# Patient Record
(demographics unavailable — no encounter records)

---

## 2025-07-30 NOTE — PHYSICAL EXAM
[FreeTextEntry1] : ============================= NEUROLOGICAL EXAMINATION ================================ [ ] GENERAL: alert, appears well, non-toxic [ ] BEHAVIOR: restricted affect, pleasant and cooperative. No disinhibition, no agitation, no apathy. Insight is present. Unclear if any anosognosia due to lack of collateral historian.  [ ] COGNITION: alert, with good basic attention (digit forward 5 ). Sustained attention good for MOCA "A" vigilance task (1/1). Working memory good (4/5, 5/5, 5/5, MOCA registration). Complex attention with mild difficulty for digit reverse (3) and WORLD reverse (5/5). Calculation for serial 7's excellent, 5/5 ([id number], 65). Language fluent, with normal prosody and phrase length. Phonemic fluency (F) was 11. Category fluency (animals) was 17. Comprehension intact to grammatically complex questions, a three-step command (3/3), and to a command crossing midline. Repetition intact (2/2). Reading intact (with only +1 surface dyslexia, 5/6 PBAC+). Writing intact (1/1). Naming intact (3/3 MOCA, 2/2 MMSE, 6/6 NIHSS, 6/6 PBAC). Oriented 5/5 to time and 5/5 to place. Short term recall for an unstructured word list was 3/5 (+1 MCQ. No benefit for cuing for RED. Isolated perseveration on WORLD). Abstraction good (2/2 MOCA). Visual-exec good for clock drawing (3/3) but error for MOCA visual trails (0/1, connected letters and numbers in 2 independent pathways, albeit correct). MOCA 27/30 [ ] SPEECH: guttural dysarthria, dysphonia (states he has dysphagia)* [ ] CRANIAL NERVES: PERRL, VFF (binocularly to finger count), EOMI, facial sensation intact bilaterally to PP, face symmetric (L ptosis, baseline x decades), facial movements full, tongue protrudes slightly to L but movements full. Uvula midline. [ ] MOTOR: Power 5/5 grossly all limbs without PND. No tremor, no bradykinesia. Some mildly increased tone bilat UE. [ ] CO-ORDINATION: FNF intact bilaterally [ ] GAIT: can rise from seated position without using arms. stooped posture, leaning to R (he says since childhood), mild reduced arm swing. narrow based and not shuffling or magnetic.

## 2025-07-30 NOTE — END OF VISIT
[Time Spent: ___ minutes] : I have spent [unfilled] minutes of time on the encounter which excludes teaching and separately reported services. sharp/stabbing

## 2025-07-30 NOTE — ASSESSMENT
[FreeTextEntry1] : ===================================== ASSESSMENT =====================================  80M former  with GERD, BPH, hypothyroid, cataract s/p surgery, dysphagia (since 2019, unclear etiology, seen by neuro at [place], had EMG and follows with SLP here), L ptosis x decades, STM loss since age 75 (circa 2020), has been followed at [place] for ?MCI. MRI there a few years ago had revealed ?possible hippocampal atrophy, and pt had formal NP testing (was told abnormal for his age). His family history is notable for AD in his older brother, "pre AD" in his older sister, and Parkinson's in his younger brother. Bedside cognitive testing today notable for mild exec dysfunction (MOCA visual trails) and short term recall 3/5 (+1 MCQ retrieval. No benefit for cuing for RED. Isolated perseveration on WORLD). MOCA 27/30. Neuro exam notable for baseline L ptosis and dysphonia (vs guttural dysarthria). Gait shows stooped posture, leaning to R (he says since childhood), mild reduced arm swing. Exam otherwise normal, non focal, and without parkinsonism.   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 It is unclear if the dysphagia is related but there are no other signs of PD/PSP/CBS.  ===================================== PLAN =====================================  - he is already on Galantamine ER 16mg (I refilled) - refilled Trazodone 50mg QHS - he is aware that he must seek further refills from PMD - MRI brain - RTC 6 months with  Gene - [consider JINNY afterwards if appropriate based on further HPI]

## 2025-07-30 NOTE — DATA REVIEWED
[de-identified] : SLP Clair Here (Aug 2023) Dysphagia Therapy.  Speech/Audio Discussion  Patient seen today for 3rd dysphagia therapy visit.  No auth required for dysphagia therapy.  Patient was initially seen on 1/7/21 for initial swallow evaluation.  At that time, patient reported a 3-year history of dysphagia characterized by pharyngeal stasis with solids and pills with inconsistent sensation of stasis as well as nasal regurgitation and occasional coughing when drinking liquids "too fast."  During evaluation on 1/7/21, patient presented with mild flaccid dysarthria with associated mild lingual fasciculations.  Occasional wet gurgly vocal quality was also noted, attributed to reduced pharyngeal secretion management.  FEES showed a primarily pharyngeal dysphagia marked by reduced pharyngeal swallow efficiency with moderate diffuse post-deglutitive pharyngeal residue with puree and solid consistencies that required repeated liquid wash and dry swallows to clear.  There was also suspected aspiration during the swallow of initial thin liquid trial but with no evidence of penetration/aspiration for the remained of the study.  Patient recommended a moist solid diet with thin liquids and with aspiration precautions, a course of dysphagia therapy, and neurological evaluation.  Patient underwent neurological evaluation at [facility], which was reportedly unremarkable.  Patient was seen for 4 dysphagia therapy sessions from 1/21/21 to 3/11/21.  Repeat FEES on 4/8/21 showed pharyngeal swallow to be essentially unchanged from baseline exam on 1/7/21 in spite of performing dysphagia exercises daily for the past three (3) months. As such, dysphagia therapy was discontinued.  A modified barium swallow study was recommended to further assess oropharyngeal swallow.  MBSS performed on 6/3/21 showed pharyngeal dysphagia with reduced pharyngeal swallow efficiency resulting in silent aspiration of thin liquid during the swallow and diffuse pharyngeal residue across trials ( puree/solid > liquid).  Patient recommended moist regular diet with nectar-thick liquids and resumption of dysphagia therapy.  During that visit, patient endorsed deterioration of his swallow functions since stopping dysphagia therapy in April 2021.  Subsequently, patient was seen for 3 session of dysphagia therapy from 6/22 to 7/20/21.  He was then seen for repeat MBSS on 9/21/21 ( after 3 months of performing swallow exercises).  Exam again showed moderate pharyngeal dysphagia with reduced pharyngeal clearance and aspiration of residue during latter part of the study with inconsistent cough response.  Patient recommended moist solid diet with thin liquids with safe swallow strategies.  Given limited change in swallow functions with dysphagia therapy course, patient was discharged from skilled SLP services.  He was, however, recommended to continue with swallow exercises to maintain therapeutic gains.  Patient reported that he continued performing swallow exercises up until Nov 2021 then stopped as he was not seeing improvement.   Patient then experienced some decline in his swallowing functions in early 2022.  He noted some chest pain which he thought might be related to aspiration.  He then resumed safe swallow strategies and took meds with nectar-thick liquids and chest pain resolved.  He then pursued further swallow work-up.  He consulted with Dr. Phelps (GI) and underwent testing including manometry and pH probe.  Patient reported that tests showed he had high frequency of reflux events thought to be related to his hiatal hernia.  He was switched to Dexilant from omeprazole and was recommended consultation for possible Nissen funduplication.  Patient also seen at [facility] for repeat MBSS on 5/24/22.  Exam showed a mild to moderate dysphagia with trace (silent) aspiration of thin liquid via uncontrolled cup sip during and after the swallow and moderate pharyngeal residue across trials but with ability to clear with dry swallows.  He was recommended regular diet with thin liquids with aspiration precautions and resumption of dysphagia therapy.  Patient then seen on 6/10/22 for dysphagia therapy visit.  At that time, patient was instructed in performing Camille maneuver x 20 reps, CTAR using Shaker protocol, and EMST (training started at 1/4 turn above 90).  During our follow-up visit on 9/26/22, patient reported that he has been performing swallow exercises for 3+ months but that his swallow functions were generally unchanged.  Given this, we discussed discontinuing swallow exercises as they do not seem beneficial in improving his swallow functions.  He has discontinued performing swallow exercises in Nov 2022 and he has noted some deterioration of his swallowing functions in the past couple of months.  As such, he reconsulted with our service for swallowing re-evaluation.  Patient then underwent repeat MBSS on 7/26/23.  Exam showed persistent moderate to severe dysphagia with trace deep penetration of thin liquid during the swallow and significant pharyngeal residue on puree that did not clear with dry swallows.  Liquid wash resulted in silent aspiration during the swallow.  Patient recommended to continue with regular diet with thin liquids as tolerated, as well as continuation of dysphagia exercises.  Overall, pharyngeal swallow efficiency is reduced on current exam compared to swallow functions on MBSS in 2021/2022.   Today, pt received awake and alert, pleasant.  He reported that he resumed performing CTAR exercises and Camille maneuver ( 2 sets of 10 reps) 2x a day since his last MBSS and he feels that his overall swallow function is better.  He reported coughing on thin liquids on rare occasion and sensation of pharyngeal residue with solid foods that is cleared with liquid wash.  He is currently on a regular diet with thin liquids and using mildly-thick liquids when taking pills.  He eats 2-3 meals per day and takes about 1 hour to consume his main meal.  Patient also reported that he had cataract surgery a couple of weeks ago.  Results of his recent MBSS and previous MBSS were reviewed with patient using video clips from these exams.  Salient findings of increased pharyngeal residue with reduced ability to clear, as well as silent aspiration of thin liquid (when used as liquid wash) were emphasized.  Given his report of swallow deterioration without exercise and perceived improvement since he restarted swallow exercises, we discussed for him to continue performing CTAR exercises (3 repetitions of isometric exercise- holding chin tuck position for 1 minute and 3 sets of isokinetic exercise -10 reps of chin tuck holding position for 1 sec) and Camille maneuver x 20 reps.  Given his silent aspiration, we also discussed resuming EMST 3x a week once cleared by ophtalmologist given recent cataract surgery.  Patient will monitor his swallow functions and RTC for follow-up or re-evaluation prn.

## 2025-07-30 NOTE — ASSESSMENT
[FreeTextEntry1] : ===================================== ASSESSMENT =====================================  80M former  with GERD, BPH, hypothyroid, cataract s/p surgery, dysphagia (since 2019, unclear etiology, seen by neuro at [place], had EMG and follows with SLP here), L ptosis x decades, STM loss since age 75 (circa 2020), has been followed at [place] for ?MCI. MRI there a few years ago had revealed ?possible hippocampal atrophy, and pt had formal NP testing (was told abnormal for his age). His family history is notable for AD in his older brother, "pre AD" in his older sister, and Parkinson's in his younger brother. Bedside cognitive testing today notable for mild exec dysfunction (MOCA visual trails) and short term recall 3/5 (+1 MCQ retrieval. No benefit for cuing for RED. Isolated perseveration on WORLD). MOCA 27/30. Neuro exam notable for baseline L ptosis and dysphonia (vs guttural dysarthria). Gait shows stooped posture, leaning to R (he says since childhood), mild reduced arm swing. Exam otherwise normal, non focal, and without parkinsonism.   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 It is unclear if the dysphagia is related but there are no other signs of PD/PSP/CBS.  ===================================== PLAN =====================================  - he is already on Galantamine ER 16mg (I refilled) - refilled Trazodone 50mg QHS - he is aware that he must seek further refills from PMD - MRI brain - RTC 6 months with  Gene - [consider JINNY afterwards if appropriate based on further HPI]

## 2025-07-30 NOTE — DATA REVIEWED
[de-identified] : SLP Clair Here (Aug 2023) Dysphagia Therapy.  Speech/Audio Discussion  Patient seen today for 3rd dysphagia therapy visit.  No auth required for dysphagia therapy.  Patient was initially seen on 1/7/21 for initial swallow evaluation.  At that time, patient reported a 3-year history of dysphagia characterized by pharyngeal stasis with solids and pills with inconsistent sensation of stasis as well as nasal regurgitation and occasional coughing when drinking liquids "too fast."  During evaluation on 1/7/21, patient presented with mild flaccid dysarthria with associated mild lingual fasciculations.  Occasional wet gurgly vocal quality was also noted, attributed to reduced pharyngeal secretion management.  FEES showed a primarily pharyngeal dysphagia marked by reduced pharyngeal swallow efficiency with moderate diffuse post-deglutitive pharyngeal residue with puree and solid consistencies that required repeated liquid wash and dry swallows to clear.  There was also suspected aspiration during the swallow of initial thin liquid trial but with no evidence of penetration/aspiration for the remained of the study.  Patient recommended a moist solid diet with thin liquids and with aspiration precautions, a course of dysphagia therapy, and neurological evaluation.  Patient underwent neurological evaluation at [facility], which was reportedly unremarkable.  Patient was seen for 4 dysphagia therapy sessions from 1/21/21 to 3/11/21.  Repeat FEES on 4/8/21 showed pharyngeal swallow to be essentially unchanged from baseline exam on 1/7/21 in spite of performing dysphagia exercises daily for the past three (3) months. As such, dysphagia therapy was discontinued.  A modified barium swallow study was recommended to further assess oropharyngeal swallow.  MBSS performed on 6/3/21 showed pharyngeal dysphagia with reduced pharyngeal swallow efficiency resulting in silent aspiration of thin liquid during the swallow and diffuse pharyngeal residue across trials ( puree/solid > liquid).  Patient recommended moist regular diet with nectar-thick liquids and resumption of dysphagia therapy.  During that visit, patient endorsed deterioration of his swallow functions since stopping dysphagia therapy in April 2021.  Subsequently, patient was seen for 3 session of dysphagia therapy from 6/22 to 7/20/21.  He was then seen for repeat MBSS on 9/21/21 ( after 3 months of performing swallow exercises).  Exam again showed moderate pharyngeal dysphagia with reduced pharyngeal clearance and aspiration of residue during latter part of the study with inconsistent cough response.  Patient recommended moist solid diet with thin liquids with safe swallow strategies.  Given limited change in swallow functions with dysphagia therapy course, patient was discharged from skilled SLP services.  He was, however, recommended to continue with swallow exercises to maintain therapeutic gains.  Patient reported that he continued performing swallow exercises up until Nov 2021 then stopped as he was not seeing improvement.   Patient then experienced some decline in his swallowing functions in early 2022.  He noted some chest pain which he thought might be related to aspiration.  He then resumed safe swallow strategies and took meds with nectar-thick liquids and chest pain resolved.  He then pursued further swallow work-up.  He consulted with Dr. Phelps (GI) and underwent testing including manometry and pH probe.  Patient reported that tests showed he had high frequency of reflux events thought to be related to his hiatal hernia.  He was switched to Dexilant from omeprazole and was recommended consultation for possible Nissen funduplication.  Patient also seen at [facility] for repeat MBSS on 5/24/22.  Exam showed a mild to moderate dysphagia with trace (silent) aspiration of thin liquid via uncontrolled cup sip during and after the swallow and moderate pharyngeal residue across trials but with ability to clear with dry swallows.  He was recommended regular diet with thin liquids with aspiration precautions and resumption of dysphagia therapy.  Patient then seen on 6/10/22 for dysphagia therapy visit.  At that time, patient was instructed in performing Camille maneuver x 20 reps, CTAR using Shaker protocol, and EMST (training started at 1/4 turn above 90).  During our follow-up visit on 9/26/22, patient reported that he has been performing swallow exercises for 3+ months but that his swallow functions were generally unchanged.  Given this, we discussed discontinuing swallow exercises as they do not seem beneficial in improving his swallow functions.  He has discontinued performing swallow exercises in Nov 2022 and he has noted some deterioration of his swallowing functions in the past couple of months.  As such, he reconsulted with our service for swallowing re-evaluation.  Patient then underwent repeat MBSS on 7/26/23.  Exam showed persistent moderate to severe dysphagia with trace deep penetration of thin liquid during the swallow and significant pharyngeal residue on puree that did not clear with dry swallows.  Liquid wash resulted in silent aspiration during the swallow.  Patient recommended to continue with regular diet with thin liquids as tolerated, as well as continuation of dysphagia exercises.  Overall, pharyngeal swallow efficiency is reduced on current exam compared to swallow functions on MBSS in 2021/2022.   Today, pt received awake and alert, pleasant.  He reported that he resumed performing CTAR exercises and Camille maneuver ( 2 sets of 10 reps) 2x a day since his last MBSS and he feels that his overall swallow function is better.  He reported coughing on thin liquids on rare occasion and sensation of pharyngeal residue with solid foods that is cleared with liquid wash.  He is currently on a regular diet with thin liquids and using mildly-thick liquids when taking pills.  He eats 2-3 meals per day and takes about 1 hour to consume his main meal.  Patient also reported that he had cataract surgery a couple of weeks ago.  Results of his recent MBSS and previous MBSS were reviewed with patient using video clips from these exams.  Salient findings of increased pharyngeal residue with reduced ability to clear, as well as silent aspiration of thin liquid (when used as liquid wash) were emphasized.  Given his report of swallow deterioration without exercise and perceived improvement since he restarted swallow exercises, we discussed for him to continue performing CTAR exercises (3 repetitions of isometric exercise- holding chin tuck position for 1 minute and 3 sets of isokinetic exercise -10 reps of chin tuck holding position for 1 sec) and Camille maneuver x 20 reps.  Given his silent aspiration, we also discussed resuming EMST 3x a week once cleared by ophtalmologist given recent cataract surgery.  Patient will monitor his swallow functions and RTC for follow-up or re-evaluation prn.

## 2025-07-30 NOTE — HISTORY OF PRESENT ILLNESS
[FreeTextEntry1] : Chief Complaint: memory loss ============= Referring Provider: former [facility] patient; pt looking to switch to [facility] and [facility]  ============== =========================== History of Presenting Illness: =========================== 80M with GERD, BPH, hypothyroid, dysphagia (since 2019, unclear etiology, seen by neuro at [facility], had EMG and follows with SLP here), L ptosis x decades, STM loss since age 75, is here for transfer of care. He was being followed for his ?possible MCI by neuropsychiatry at [facility] (Dr Dary Samson-  of Neurology and Psychiatry, and the  for Behavioral Neurology Education). Pt was told the doctor was leaving the practice, and then pt wasn't able to get appointment at either [facility] or [facility] in a timely fashion. Switched to [facility] - got appointment here 2 months ago (although was ultimately given an appointment at [facility] but cancelled due to frustration with the process).  He also needs Trazodone refills, which had been prescribed by Dr Samson.  Patient is here today alone, in the absence of a collateral historian. The patient lives with his  (Johny Verduzco, age 65); they've been together for 30 years.   Patient saw [facility] neuropsych first circa , b/c he realized something was wrong: had difficulty doing simple mental arithmetic while visually spacing tiles during a DIY project (he would usually be good at this). At some point, he made some navigation errors in a rental car (has since decided not to drive anymore).   Pt states that he underwent cognitive testing and MRI brain (most recent ) - was told that "the memory centers were abnormally shrunken". He has been taking galantamine since . He doesn't know diagnosis but states he had significant cognitive impairment that was " abnormal for my age". He was not told anything about Alzheimer's pathology or dementia.   iADLS - Patient is a former  (code, published financial report). He retired age 73 (end of 2018). He was not having any issues on the job. Pt does the finances.  Johny does the cooking. Pt helps with some cleaning. He denies issues. As mentioned above, had problems navigating in his rental car and stopped driving. He navigated to his appointment here in  today.  Cognitive ROS: ---------------------- [] Mood: pretty good; denies sadness or depression [] Psychosis: no hallucinations or delusions [] Sleep: uses Trazodone b/c melatonin didn't work. No problems with sleep initiation but rather sleep maintenance. The Trazodone helped but may be causing vivid dreams and increased urination.  has never told pt that he acts out his dreams. No circadian rhythm reversal.  [] Appetite: steady, no weight changes.  [] Motor: ambulates independently without assist devices. Practices balance exercises b/c is a bit wobbly at times. No falls or almost-falls. No tremor or limb stiffness. No weakness of limbs, including of the fine movements, and no problems rising from a chair.  [] LOC: no [] UI: has increased urinary frequency with nocturia x3 but no UI [] Anosmia: no [] Dysphagia: yes, since 2019 (see SLP notes) - has been seen at [facility] and [facility] -- he doesn't remember name and cannot describe her.  [] Other: no diplopia   ADLs -------- [] Toileting: Self. No UI/FI. [] Bathing: Self. Does not need to be prompted. It is performed correctly. [] Eating: Self. Cleanly with a knife and fork. [] Dressing: Self, correctly. No outfit incoordination, no outfit repetition, no dressing apraxia. Wears context-appropriate clothes (e.g. warm clothes in winter).   =========================== Medications: =========================== Trazodone 50mg Gabapenting 100mg TID Galantamine ER 16mg (pain in buttocks) ASA 81mg (primary prevention) Levothyroxine 137 mcg Famotidine 40mg Dexlansoprazole  =========================== Medication Allergies: =========================== NKDA   =========================== Past Medical History: =========================== GERD, BPH, hypothyroid, dysphagia (since 2019, seen at [facility], had EMG and follows with SLP here)   =========================== Family History: =========================== Older brother passed away a few years ago had terrible memory loss and "it happened very fast", although it was some form of AD Older sister has been diagnosed with "pre" AD Younger brother age late 70s has Parkinson's Parents didn't have any cognitive issues - father  age 71, mother lived until age 89   =========================== Social History: =========================== [] Smoking: no [] EtoH: no [] Recreational Drugs: no (former recreational MJ) [] Domestic: lives with  [] Occupation: former IT

## 2025-07-30 NOTE — HISTORY OF PRESENT ILLNESS
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 He was being followed for his ?possible MCI by neuropsychiatry at [facility] (Dr Dary Samson-  of Neurology and Psychiatry, and the  for Behavioral Neurology Education). Pt was told the doctor was leaving the practice, and then pt wasn't able to get appointment at either [facility] or [facility] in a timely fashion. Switched to [facility] - got appointment here 2 months ago (although was ultimately given an appointment at [facility] but cancelled due to frustration with the process).  He also needs Trazodone refills, which had been prescribed by Dr Samson.  Patient is here today alone, in the absence of a collateral historian. The patient lives with his  (Johny Verduzco, age 65); they've been together for 30 years.   Patient saw [facility] neuropsych first circa , b/c he realized something was wrong: had difficulty doing simple mental arithmetic while visually spacing tiles during a DIY project (he would usually be good at this). At some point, he made some navigation errors in a rental car (has since decided not to drive anymore).   Pt states that he underwent cognitive testing and MRI brain (most recent ) - was told that "the memory centers were abnormally shrunken". He has been taking galantamine since . He doesn't know diagnosis but states he had significant cognitive impairment that was " abnormal for my age". He was not told anything about Alzheimer's pathology or dementia.   iADLS - Patient is a former  (code, published financial report). He retired age 73 (end of 2018). He was not having any issues on the job. Pt does the finances.  Johny does the cooking. Pt helps with some cleaning. He denies issues. As mentioned above, had problems navigating in his rental car and stopped driving. He navigated to his appointment here in  today.  Cognitive ROS: ---------------------- [] Mood: pretty good; denies sadness or depression [] Psychosis: no hallucinations or delusions [] Sleep: uses Trazodone b/c melatonin didn't work. No problems with sleep initiation but rather sleep maintenance. The Trazodone helped but may be causing vivid dreams and increased urination.  has never told pt that he acts out his dreams. No circadian rhythm reversal.  [] Appetite: steady, no weight changes.  [] Motor: ambulates independently without assist devices. Practices balance exercises b/c is a bit wobbly at times. No falls or almost-falls. No tremor or limb stiffness. No weakness of limbs, including of the fine movements, and no problems rising from a chair.  [] LOC: no [] UI: has increased urinary frequency with nocturia x3 but no UI [] Anosmia: no [] Dysphagia: yes, since 2019 (see SLP notes) - has been seen at [facility] and [facility] -- he doesn't remember name and cannot describe her.  [] Other: no diplopia   ADLs -------- [] Toileting: Self. No UI/FI. [] Bathing: Self. Does not need to be prompted. It is performed correctly. [] Eating: Self. Cleanly with a knife and fork. [] Dressing: Self, correctly. No outfit incoordination, no outfit repetition, no dressing apraxia. Wears context-appropriate clothes (e.g. warm clothes in winter).   =========================== Medications: =========================== Trazodone 50mg Gabapenting 100mg TID Galantamine ER 16mg (pain in buttocks) ASA 81mg (primary prevention) Levothyroxine 137 mcg Famotidine 40mg Dexlansoprazole  =========================== Medication Allergies: =========================== NKDA   =========================== Past Medical History: =========================== GERD, BPH, hypothyroid, dysphagia (since 2019, seen at [facility], had EMG and follows with SLP here)   =========================== Family History: =========================== Older brother passed away a few years ago had terrible memory loss and "it happened very fast", although it was some form of AD Older sister has been diagnosed with "pre" AD Younger brother age late 70s has Parkinson's Parents didn't have any cognitive issues - father  age 71, mother lived until age 89   =========================== Social History: =========================== [] Smoking: no [] EtoH: no [] Recreational Drugs: no (former recreational MJ) [] Domestic: lives with  [] Occupation: former IT